# Patient Record
Sex: FEMALE | Race: WHITE | NOT HISPANIC OR LATINO | URBAN - METROPOLITAN AREA
[De-identification: names, ages, dates, MRNs, and addresses within clinical notes are randomized per-mention and may not be internally consistent; named-entity substitution may affect disease eponyms.]

---

## 2019-12-23 ENCOUNTER — TRANSCRIBE ORDERS (OUTPATIENT)
Dept: OBSTETRICS AND GYNECOLOGY | Facility: HOSPITAL | Age: 24
End: 2019-12-23

## 2019-12-23 DIAGNOSIS — O99.210 OBESITY IN PREGNANCY, ANTEPARTUM: ICD-10-CM

## 2019-12-23 DIAGNOSIS — O35.2XX0: Primary | ICD-10-CM

## 2019-12-23 DIAGNOSIS — E66.01 MORBID OBESITY WITH BODY MASS INDEX (BMI) OF 40.0 TO 44.9 IN ADULT (HCC): ICD-10-CM

## 2020-01-20 ENCOUNTER — OFFICE VISIT (OUTPATIENT)
Dept: OBSTETRICS AND GYNECOLOGY | Facility: HOSPITAL | Age: 25
End: 2020-01-20

## 2020-01-20 ENCOUNTER — HOSPITAL ENCOUNTER (OUTPATIENT)
Dept: WOMENS IMAGING | Facility: HOSPITAL | Age: 25
Discharge: HOME | End: 2020-01-20
Admitting: OBSTETRICS & GYNECOLOGY

## 2020-01-20 VITALS
DIASTOLIC BLOOD PRESSURE: 66 MMHG | HEIGHT: 66 IN | WEIGHT: 263 LBS | SYSTOLIC BLOOD PRESSURE: 118 MMHG | BODY MASS INDEX: 42.27 KG/M2

## 2020-01-20 DIAGNOSIS — Z34.90 PREGNANCY, UNSPECIFIED GESTATIONAL AGE: ICD-10-CM

## 2020-01-20 DIAGNOSIS — Z82.79 FAMILY HISTORY OF CONGENITAL HEART DEFECT: Primary | ICD-10-CM

## 2020-01-20 DIAGNOSIS — O35.2XX0: ICD-10-CM

## 2020-01-20 DIAGNOSIS — O99.210 OBESITY IN PREGNANCY, ANTEPARTUM: ICD-10-CM

## 2020-01-20 DIAGNOSIS — E66.01 MORBID OBESITY WITH BMI OF 40.0-44.9, ADULT (HCC): ICD-10-CM

## 2020-01-20 DIAGNOSIS — E66.01 MORBID OBESITY WITH BODY MASS INDEX (BMI) OF 40.0 TO 44.9 IN ADULT (HCC): ICD-10-CM

## 2020-01-20 DIAGNOSIS — O34.219 PREVIOUS CESAREAN DELIVERY AFFECTING PREGNANCY: ICD-10-CM

## 2020-01-20 PROCEDURE — 93325 DOPPLER ECHO COLOR FLOW MAPG: CPT | Performed by: OBSTETRICS & GYNECOLOGY

## 2020-01-20 PROCEDURE — 76825 ECHO EXAM OF FETAL HEART: CPT

## 2020-01-20 PROCEDURE — 76811 OB US DETAILED SNGL FETUS: CPT | Performed by: OBSTETRICS & GYNECOLOGY

## 2020-01-20 PROCEDURE — 76811 OB US DETAILED SNGL FETUS: CPT

## 2020-01-20 PROCEDURE — 93325 DOPPLER ECHO COLOR FLOW MAPG: CPT

## 2020-01-20 PROCEDURE — 76825 ECHO EXAM OF FETAL HEART: CPT | Performed by: OBSTETRICS & GYNECOLOGY

## 2020-01-20 RX ORDER — ASPIRIN 81 MG/1
81 TABLET, CHEWABLE ORAL DAILY
COMMUNITY

## 2020-01-20 RX ORDER — PRENATAL WITH FERROUS FUM AND FOLIC ACID 3080; 920; 120; 400; 22; 1.84; 3; 20; 10; 1; 12; 200; 27; 25; 2 [IU]/1; [IU]/1; MG/1; [IU]/1; MG/1; MG/1; MG/1; MG/1; MG/1; MG/1; UG/1; MG/1; MG/1; MG/1; MG/1
1 TABLET ORAL DAILY
COMMUNITY

## 2020-01-20 NOTE — PROGRESS NOTES
Denies any complaints.  Next OB visit 1/24/2020.  Reports NIPT low risk, female.  States she is a carrier for CF.